# Patient Record
(demographics unavailable — no encounter records)

---

## 2024-10-17 NOTE — HISTORY OF PRESENT ILLNESS
[de-identified] : s/p left femur IMN, DOS: 8/30/24 [de-identified] : Ms. CINDY PANG is a 88 year old woman presents today for post-op appointment s/p left femur IMN on 8/30/24. Since her last visit she states she is feeling better. She has been participating in PT twice a week. She has been ambulating with a walker for balance.  [de-identified] : The patient is sitting comfortably in the exam room.  LEFT hip -Skin is intact, no swelling, no ecchymosis -incisions well-healed, no erythema, no signs of infection -No tenderness to palpation over the greater trochanter -Painless range of motion hip -Sensation is intact L1-S1 -5/5 EHL, FHL, TA, GS, quadriceps, hamstrings -Foot is warm and well-perfused, palpable dorsalis pedis pulse  [de-identified] :  X-rays of the left hip and AP pelvis were taken in the office today, 10/17/27. X-rays include AP and lateral of the hip.  [de-identified] : 88-year-old woman s/p left femur IMN, nearly 7 weeks out.  [de-identified] : -X-ray and physical exam findings were discussed with the patient -Weightbearing as tolerated left LE -Physical therapy: gait training  -Follow up in 2 months with x-rays of the left hip and AP pelvis at that time. -All the patient's questions and concerns were addressed during this visit

## 2024-10-17 NOTE — HISTORY OF PRESENT ILLNESS
[de-identified] : s/p left femur IMN, DOS: 8/30/24 [de-identified] : Ms. CINDY PANG is a 88 year old woman presents today for post-op appointment s/p left femur IMN on 8/30/24. Since her last visit she states she is feeling better. She has been participating in PT twice a week. She has been ambulating with a walker for balance.  [de-identified] : The patient is sitting comfortably in the exam room.  LEFT hip -Skin is intact, no swelling, no ecchymosis -incisions well-healed, no erythema, no signs of infection -No tenderness to palpation over the greater trochanter -Painless range of motion hip -Sensation is intact L1-S1 -5/5 EHL, FHL, TA, GS, quadriceps, hamstrings -Foot is warm and well-perfused, palpable dorsalis pedis pulse  [de-identified] :  X-rays of the left hip and AP pelvis were taken in the office today, 10/17/27. X-rays include AP and lateral of the hip.  [de-identified] : 88-year-old woman s/p left femur IMN, nearly 7 weeks out.  [de-identified] : -X-ray and physical exam findings were discussed with the patient -Weightbearing as tolerated left LE -Physical therapy: gait training  -Follow up in 2 months with x-rays of the left hip and AP pelvis at that time. -All the patient's questions and concerns were addressed during this visit

## 2025-01-09 NOTE — HISTORY OF PRESENT ILLNESS
[de-identified] : Ms. CINDY PANG is a 88 year old woman presents today for follow up appointment s/p left femur IMN on 8/30/24. Since her last visit she states she is feeling better. She denies pain and is very happy with her progress. She completed PT recently and has been able to perform ADL's with no issues.

## 2025-01-09 NOTE — DISCUSSION/SUMMARY
[de-identified] : 88-year-old woman s/p left femur IMN, approximately 4 months out.    -X-ray and physical exam findings were discussed with the patient -Weightbearing as tolerated left LE -Physical therapy: gait training -Follow up in 6 months with x-rays of the left hip and AP pelvis at that time. -All the patient's questions and concerns were addressed during this visit.

## 2025-01-09 NOTE — HISTORY OF PRESENT ILLNESS
[de-identified] : Ms. CINDY PANG is a 88 year old woman presents today for follow up appointment s/p left femur IMN on 8/30/24. Since her last visit she states she is feeling better. She denies pain and is very happy with her progress. She completed PT recently and has been able to perform ADL's with no issues.

## 2025-01-09 NOTE — DISCUSSION/SUMMARY
[de-identified] : 88-year-old woman s/p left femur IMN, approximately 4 months out.    -X-ray and physical exam findings were discussed with the patient -Weightbearing as tolerated left LE -Physical therapy: gait training -Follow up in 6 months with x-rays of the left hip and AP pelvis at that time. -All the patient's questions and concerns were addressed during this visit.

## 2025-01-09 NOTE — PHYSICAL EXAM
[de-identified] : The patient is sitting comfortably in the exam room. LEFT hip -Skin is intact, no swelling, no ecchymosis -incisions well-healed, no erythema, no signs of infection -No tenderness to palpation over the greater trochanter -Painless range of motion hip -Sensation is intact L1-S1 -5/5 EHL, FHL, TA, GS, quadriceps, hamstrings -Foot is warm and well-perfused, palpable dorsalis pedis pulse. [de-identified] : X-rays of the left hip and AP pelvis were taken in the office today, 1/9/25. X-rays include AP and lateral of the hip. X-rays show great overall alignment of the left hip. The patient is status post short cephalomedullary nail. Implants are in good position. No lucencies around the screws.

## 2025-01-09 NOTE — PHYSICAL EXAM
Reflux stable at this time.        [de-identified] : The patient is sitting comfortably in the exam room. LEFT hip -Skin is intact, no swelling, no ecchymosis -incisions well-healed, no erythema, no signs of infection -No tenderness to palpation over the greater trochanter -Painless range of motion hip -Sensation is intact L1-S1 -5/5 EHL, FHL, TA, GS, quadriceps, hamstrings -Foot is warm and well-perfused, palpable dorsalis pedis pulse. [de-identified] : X-rays of the left hip and AP pelvis were taken in the office today, 1/9/25. X-rays include AP and lateral of the hip. X-rays show great overall alignment of the left hip. The patient is status post short cephalomedullary nail. Implants are in good position. No lucencies around the screws.